# Patient Record
Sex: MALE | Race: WHITE | URBAN - METROPOLITAN AREA
[De-identification: names, ages, dates, MRNs, and addresses within clinical notes are randomized per-mention and may not be internally consistent; named-entity substitution may affect disease eponyms.]

---

## 2021-12-16 ENCOUNTER — OFFICE VISIT (OUTPATIENT)
Dept: URBAN - METROPOLITAN AREA CLINIC 75 | Facility: CLINIC | Age: 71
End: 2021-12-16
Payer: MEDICARE

## 2021-12-16 DIAGNOSIS — H01.001 UNSPECIFIED BLEPARITIS OF RIGHT UPPER EYELID: ICD-10-CM

## 2021-12-16 DIAGNOSIS — H40.1121 PRIMARY OPEN-ANGLE GLAUCOMA, LEFT EYE, MILD STAGE: ICD-10-CM

## 2021-12-16 DIAGNOSIS — H25.813 COMBINED FORMS OF AGE-RELATED CATARACT, BILATERAL: ICD-10-CM

## 2021-12-16 DIAGNOSIS — H43.813 VITREOUS DEGENERATION, BILATERAL: ICD-10-CM

## 2021-12-16 DIAGNOSIS — H01.004 UNSPECIFIED BLEPHARITIS OF LEFT UPPER EYELID: ICD-10-CM

## 2021-12-16 DIAGNOSIS — H40.1411 CAPSULAR GLAUCOMA WITH PSEUDOEXFOLIATION OF LENS, RIGHT EYE, MILD STAGE: Primary | ICD-10-CM

## 2021-12-16 PROCEDURE — 92134 CPTRZ OPH DX IMG PST SGM RTA: CPT | Performed by: OPTOMETRIST

## 2021-12-16 PROCEDURE — 99204 OFFICE O/P NEW MOD 45 MIN: CPT | Performed by: OPTOMETRIST

## 2021-12-16 PROCEDURE — 92133 CPTRZD OPH DX IMG PST SGM ON: CPT | Performed by: OPTOMETRIST

## 2021-12-16 RX ORDER — TIMOLOL MALEATE 5 MG/ML
0.5 % SOLUTION/ DROPS OPHTHALMIC
Qty: 10 | Refills: 3 | Status: INACTIVE
Start: 2021-12-16 | End: 2022-02-24

## 2021-12-16 ASSESSMENT — INTRAOCULAR PRESSURE
OS: 18
OD: 20

## 2021-12-16 NOTE — IMPRESSION/PLAN
Impression: Vitreous degeneration, bilateral: H43.813. Plan: Reviewed the signs and symptoms of a retinal tear and detachment in detail with the patient, including worsening flashes, new floaters, and development of a shadow/curtain in the peripheral visual field. The patient was advised to call immediately with any changes to [address] or increase in symptoms.

## 2021-12-16 NOTE — IMPRESSION/PLAN
Impression: Unspecified blepharitis of left upper eyelid: H01.004. Plan: Blepharitis may improve with warm compresses, lid scrubs, and ophthalmic antibiotics. The patient should apply warm compress to affected eyes for 5 minutes at a time, 3 to 4 times a day. The patient should perform lid scrubs every night following the warm compress. Contact office if eyelid redness, crusting, discharge, or blurry vision does not improve or gets worse.

## 2021-12-16 NOTE — IMPRESSION/PLAN
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 OCT ordered today to evaluate health of nerves. IOP is stable, WNL. Target IOP: low teens Plan: Educated pt on findings and test results. Pt is to continue taking Bimatoprost QPM OU. Will begin pt on timolol QAM OU.

## 2022-01-26 ENCOUNTER — OFFICE VISIT (OUTPATIENT)
Dept: URBAN - METROPOLITAN AREA CLINIC 75 | Facility: CLINIC | Age: 72
End: 2022-01-26
Payer: MEDICARE

## 2022-01-26 PROCEDURE — 92014 COMPRE OPH EXAM EST PT 1/>: CPT | Performed by: OPTOMETRIST

## 2022-01-26 PROCEDURE — 92083 EXTENDED VISUAL FIELD XM: CPT | Performed by: OPTOMETRIST

## 2022-01-26 ASSESSMENT — INTRAOCULAR PRESSURE
OD: 11
OS: 11

## 2022-01-26 NOTE — IMPRESSION/PLAN
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 IOP is stable, WNL. Target IOP: low teens Plan: Educated pt on findings and test results. Pt is to continue taking Bimatoprost QPM OU & timolol QAM OU.

## 2022-01-26 NOTE — IMPRESSION/PLAN
Impression: Vitreous degeneration, bilateral: H43.813. Plan: Reviewed the signs and symptoms of a retinal tear and detachment in detail with the patient, including worsening flashes, new floaters, and development of a shadow/curtain in the peripheral visual field. The patient was advised to call immediately with any changes to [address] or increase in symptoms.

## 2022-09-07 ENCOUNTER — OFFICE VISIT (OUTPATIENT)
Dept: URBAN - METROPOLITAN AREA CLINIC 72 | Facility: CLINIC | Age: 72
End: 2022-09-07
Payer: MEDICARE

## 2022-09-07 DIAGNOSIS — H40.1431 CAPSLR GLAUCOMA W/PSEUDXF LENS, BILATERAL, MILD STAGE: Primary | ICD-10-CM

## 2022-09-07 DIAGNOSIS — H52.4 PRESBYOPIA: ICD-10-CM

## 2022-09-07 PROCEDURE — 99213 OFFICE O/P EST LOW 20 MIN: CPT

## 2022-09-07 PROCEDURE — 92133 CPTRZD OPH DX IMG PST SGM ON: CPT

## 2022-09-07 ASSESSMENT — VISUAL ACUITY
OS: 20/20
OD: 20/20

## 2022-09-07 ASSESSMENT — INTRAOCULAR PRESSURE
OD: 12
OS: 12

## 2022-09-07 NOTE — IMPRESSION/PLAN
Impression: Capslr glaucoma w/pseudxf lens, bilateral, mild stage: H40.1431. Pseudoexfoliation OD seen  

IOP 12 OU TMAX 23 18 per old chart notes. OCT ordered and done today 67 77 superior and inferior thinning OD WNL OS Plan: Discussed DX and today's findings, including testing, with pt Pt and IOP doing well on current GTTS regimen Recommend pt to continue with drops as prescribed Timolol QAM OU and Bimatoprost QHS OU Pt voices understanding

## 2023-03-01 ENCOUNTER — OFFICE VISIT (OUTPATIENT)
Dept: URBAN - METROPOLITAN AREA CLINIC 72 | Facility: CLINIC | Age: 73
End: 2023-03-01
Payer: MEDICARE

## 2023-03-01 DIAGNOSIS — H43.813 VITREOUS DEGENERATION, BILATERAL: ICD-10-CM

## 2023-03-01 DIAGNOSIS — H25.813 COMBINED FORMS OF AGE-RELATED CATARACT, BILATERAL: ICD-10-CM

## 2023-03-01 DIAGNOSIS — H40.1431 CAPSULAR GLAUCOMA WITH PSEUDOEXFOLIATION OF LENS, BILATERAL, MILD STAGE: Primary | ICD-10-CM

## 2023-03-01 PROCEDURE — 76514 ECHO EXAM OF EYE THICKNESS: CPT

## 2023-03-01 PROCEDURE — 99212 OFFICE O/P EST SF 10 MIN: CPT

## 2023-03-01 RX ORDER — TIMOLOL MALEATE 5 MG/ML
0.5 % SOLUTION/ DROPS OPHTHALMIC
Qty: 10 | Refills: 3 | Status: ACTIVE
Start: 2023-03-01

## 2023-03-01 RX ORDER — LATANOPROST 50 UG/ML
0.005 % SOLUTION OPHTHALMIC
Qty: 3 | Refills: 3 | Status: ACTIVE
Start: 2023-03-01

## 2023-03-01 ASSESSMENT — INTRAOCULAR PRESSURE
OD: 8
OS: 9

## 2023-03-01 NOTE — IMPRESSION/PLAN
Impression: Combined forms of age-related cataract, bilateral: H25.813. Plan: Discussed signs and symptoms of cataract progression. Pt to RTC PRN in the event of changes to visual status. No recommendation for surgery at present time. Will continue to monitor with yearly DFE.

## 2023-03-01 NOTE — IMPRESSION/PLAN
Impression: Capslr glaucoma w/pseudxf lens, bilateral, mild stage: H40.1431. Pseudoexfoliation OD seen IOP 8 9 OU TMAX 23 18 per old chart notes. CCT is C  Plan: Discussed DX and today's findings, including testing, with pt Pt and IOP doing well on current GTTS regimen, though due to insurance pt is need of switching gtts (no longer covered) Pt to continue with Timolol QAM OU and start Latanoprost 1 gtt QHS OU Pt voices understanding

## 2023-09-06 ENCOUNTER — OFFICE VISIT (OUTPATIENT)
Dept: URBAN - METROPOLITAN AREA CLINIC 72 | Facility: CLINIC | Age: 73
End: 2023-09-06
Payer: MEDICARE

## 2023-09-06 DIAGNOSIS — H25.813 COMBINED FORMS OF AGE-RELATED CATARACT, BILATERAL: ICD-10-CM

## 2023-09-06 DIAGNOSIS — H40.1121 PRIMARY OPEN-ANGLE GLAUCOMA, LEFT EYE, MILD STAGE: ICD-10-CM

## 2023-09-06 DIAGNOSIS — H40.1431 CAPSLR GLAUCOMA W/PSEUDXF LENS, BILATERAL, MILD STAGE: Primary | ICD-10-CM

## 2023-09-06 PROCEDURE — 99213 OFFICE O/P EST LOW 20 MIN: CPT

## 2023-09-06 PROCEDURE — 92083 EXTENDED VISUAL FIELD XM: CPT

## 2023-09-06 ASSESSMENT — INTRAOCULAR PRESSURE
OD: 11
OS: 12

## 2023-10-11 ENCOUNTER — OFFICE VISIT (OUTPATIENT)
Dept: URBAN - METROPOLITAN AREA CLINIC 72 | Facility: CLINIC | Age: 73
End: 2023-10-11

## 2023-10-11 DIAGNOSIS — H52.4 PRESBYOPIA: Primary | ICD-10-CM

## 2023-10-11 ASSESSMENT — INTRAOCULAR PRESSURE
OD: 10
OS: 10

## 2023-10-11 ASSESSMENT — VISUAL ACUITY
OD: 20/20
OS: 20/20

## 2024-03-27 ENCOUNTER — OFFICE VISIT (OUTPATIENT)
Dept: URBAN - METROPOLITAN AREA CLINIC 72 | Facility: CLINIC | Age: 74
End: 2024-03-27
Payer: MEDICARE

## 2024-03-27 DIAGNOSIS — H40.1121 PRIMARY OPEN-ANGLE GLAUCOMA, LEFT EYE, MILD STAGE: ICD-10-CM

## 2024-03-27 DIAGNOSIS — H43.813 VITREOUS DEGENERATION, BILATERAL: ICD-10-CM

## 2024-03-27 DIAGNOSIS — H40.1411 CAPSULAR GLAUCOMA WITH PSEUDOEXFOLIATION OF LENS, RIGHT EYE, MILD STAGE: Primary | ICD-10-CM

## 2024-03-27 DIAGNOSIS — H25.813 COMBINED FORMS OF AGE-RELATED CATARACT, BILATERAL: ICD-10-CM

## 2024-03-27 PROCEDURE — 99213 OFFICE O/P EST LOW 20 MIN: CPT

## 2024-03-27 PROCEDURE — 92133 CPTRZD OPH DX IMG PST SGM ON: CPT

## 2024-03-27 RX ORDER — TIMOLOL MALEATE 5 MG/ML
0.5 % SOLUTION/ DROPS OPHTHALMIC
Qty: 10 | Refills: 3 | Status: ACTIVE
Start: 2024-03-27

## 2024-03-27 RX ORDER — LATANOPROST 50 UG/ML
0.005 % SOLUTION OPHTHALMIC
Qty: 7.5 | Refills: 3 | Status: ACTIVE
Start: 2024-03-27

## 2024-03-27 ASSESSMENT — INTRAOCULAR PRESSURE
OD: 12
OS: 12

## 2024-11-13 ENCOUNTER — OFFICE VISIT (OUTPATIENT)
Dept: URBAN - METROPOLITAN AREA CLINIC 72 | Facility: CLINIC | Age: 74
End: 2024-11-13
Payer: MEDICARE

## 2024-11-13 DIAGNOSIS — H25.813 COMBINED FORMS OF AGE-RELATED CATARACT, BILATERAL: ICD-10-CM

## 2024-11-13 DIAGNOSIS — H40.1411 CAPSULAR GLAUCOMA WITH PSEUDOEXFOLIATION OF LENS, RIGHT EYE, MILD STAGE: ICD-10-CM

## 2024-11-13 DIAGNOSIS — H43.813 VITREOUS DEGENERATION, BILATERAL: ICD-10-CM

## 2024-11-13 DIAGNOSIS — H40.1121 PRIMARY OPEN-ANGLE GLAUCOMA, LEFT EYE, MILD STAGE: Primary | ICD-10-CM

## 2024-11-13 PROCEDURE — 92083 EXTENDED VISUAL FIELD XM: CPT

## 2024-11-13 PROCEDURE — 99213 OFFICE O/P EST LOW 20 MIN: CPT

## 2024-11-13 RX ORDER — TIMOLOL MALEATE 5 MG/ML
0.5 % SOLUTION/ DROPS OPHTHALMIC
Qty: 10 | Refills: 3 | Status: ACTIVE
Start: 2024-11-13

## 2024-11-13 ASSESSMENT — INTRAOCULAR PRESSURE
OD: 11
OS: 12

## 2025-06-04 ENCOUNTER — OFFICE VISIT (OUTPATIENT)
Dept: URBAN - METROPOLITAN AREA CLINIC 72 | Facility: CLINIC | Age: 75
End: 2025-06-04
Payer: MEDICARE

## 2025-06-04 DIAGNOSIS — H40.1121 PRIMARY OPEN-ANGLE GLAUCOMA, LEFT EYE, MILD STAGE: Primary | ICD-10-CM

## 2025-06-04 DIAGNOSIS — H43.813 VITREOUS DEGENERATION, BILATERAL: ICD-10-CM

## 2025-06-04 DIAGNOSIS — H40.1411 CAPSULAR GLAUCOMA WITH PSEUDOEXFOLIATION OF LENS, RIGHT EYE, MILD STAGE: ICD-10-CM

## 2025-06-04 DIAGNOSIS — H25.813 COMBINED FORMS OF AGE-RELATED CATARACT, BILATERAL: ICD-10-CM

## 2025-06-04 PROCEDURE — 92133 CPTRZD OPH DX IMG PST SGM ON: CPT

## 2025-06-04 PROCEDURE — 99213 OFFICE O/P EST LOW 20 MIN: CPT

## 2025-06-04 ASSESSMENT — INTRAOCULAR PRESSURE
OS: 9
OD: 12
OS: 12
OD: 9